# Patient Record
Sex: MALE | Race: WHITE
[De-identification: names, ages, dates, MRNs, and addresses within clinical notes are randomized per-mention and may not be internally consistent; named-entity substitution may affect disease eponyms.]

---

## 2017-12-18 ENCOUNTER — HOSPITAL ENCOUNTER (EMERGENCY)
Dept: HOSPITAL 54 - ER | Age: 42
Discharge: HOME | End: 2017-12-18
Payer: MEDICAID

## 2017-12-18 VITALS — DIASTOLIC BLOOD PRESSURE: 84 MMHG | SYSTOLIC BLOOD PRESSURE: 125 MMHG

## 2017-12-18 VITALS — BODY MASS INDEX: 35.55 KG/M2 | WEIGHT: 240 LBS | HEIGHT: 69 IN

## 2017-12-18 DIAGNOSIS — L03.113: Primary | ICD-10-CM

## 2017-12-18 DIAGNOSIS — F11.10: ICD-10-CM

## 2017-12-18 PROCEDURE — Z7610: HCPCS

## 2017-12-18 PROCEDURE — 99283 EMERGENCY DEPT VISIT LOW MDM: CPT

## 2017-12-18 PROCEDURE — A4606 OXYGEN PROBE USED W OXIMETER: HCPCS

## 2017-12-18 PROCEDURE — 96372 THER/PROPH/DIAG INJ SC/IM: CPT

## 2017-12-18 RX ADMIN — DEXTROSE MONOHYDRATE ONE MG: 50 INJECTION, SOLUTION INTRAVENOUS at 19:49

## 2018-01-21 ENCOUNTER — HOSPITAL ENCOUNTER (EMERGENCY)
Dept: HOSPITAL 54 - ER | Age: 43
LOS: 1 days | Discharge: HOME | End: 2018-01-22
Payer: MEDICAID

## 2018-01-21 VITALS — WEIGHT: 315 LBS | BODY MASS INDEX: 40.43 KG/M2 | HEIGHT: 74 IN

## 2018-01-21 VITALS — DIASTOLIC BLOOD PRESSURE: 98 MMHG | SYSTOLIC BLOOD PRESSURE: 152 MMHG

## 2018-01-21 DIAGNOSIS — L03.012: Primary | ICD-10-CM

## 2018-01-21 DIAGNOSIS — F17.200: ICD-10-CM

## 2018-01-21 PROCEDURE — 10060 I&D ABSCESS SIMPLE/SINGLE: CPT

## 2018-01-21 PROCEDURE — 99283 EMERGENCY DEPT VISIT LOW MDM: CPT

## 2018-01-21 PROCEDURE — A6402 STERILE GAUZE <= 16 SQ IN: HCPCS

## 2018-01-21 PROCEDURE — Z7610: HCPCS

## 2018-01-21 PROCEDURE — A4606 OXYGEN PROBE USED W OXIMETER: HCPCS

## 2018-01-22 NOTE — NUR
Patient discharged to home in stable condition. Written and verbal after care 
instructions given. Patient verbalizes understanding of instruction.  Pt 
ambulatory with a steady gait.  VSS NAD noted on DC.  Denies complaint on DC.

## 2018-09-03 ENCOUNTER — HOSPITAL ENCOUNTER (EMERGENCY)
Dept: HOSPITAL 54 - ER | Age: 43
Discharge: HOME | End: 2018-09-03
Payer: MEDICAID

## 2018-09-03 VITALS
HEIGHT: 73 IN | SYSTOLIC BLOOD PRESSURE: 142 MMHG | WEIGHT: 315 LBS | DIASTOLIC BLOOD PRESSURE: 88 MMHG | BODY MASS INDEX: 41.75 KG/M2

## 2018-09-03 DIAGNOSIS — J02.9: Primary | ICD-10-CM

## 2018-09-03 DIAGNOSIS — F17.200: ICD-10-CM

## 2018-09-03 PROCEDURE — 99284 EMERGENCY DEPT VISIT MOD MDM: CPT

## 2018-09-03 PROCEDURE — Z7610: HCPCS

## 2018-09-03 PROCEDURE — 87880 STREP A ASSAY W/OPTIC: CPT

## 2018-09-03 PROCEDURE — A4606 OXYGEN PROBE USED W OXIMETER: HCPCS

## 2018-09-03 PROCEDURE — 87070 CULTURE OTHR SPECIMN AEROBIC: CPT

## 2019-01-06 ENCOUNTER — HOSPITAL ENCOUNTER (EMERGENCY)
Dept: HOSPITAL 91 - FTE | Age: 44
LOS: 1 days | Discharge: HOME | End: 2019-01-07
Payer: COMMERCIAL

## 2019-01-06 ENCOUNTER — HOSPITAL ENCOUNTER (EMERGENCY)
Dept: HOSPITAL 10 - FTE | Age: 44
LOS: 1 days | Discharge: HOME | End: 2019-01-07
Payer: COMMERCIAL

## 2019-01-06 VITALS
WEIGHT: 315 LBS | BODY MASS INDEX: 41.75 KG/M2 | WEIGHT: 315 LBS | HEIGHT: 73 IN | HEIGHT: 73 IN | BODY MASS INDEX: 41.75 KG/M2

## 2019-01-06 DIAGNOSIS — F17.210: ICD-10-CM
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
## 2019-01-06 DIAGNOSIS — S60.511A: Primary | ICD-10-CM

## 2019-01-06 PROCEDURE — 73562 X-RAY EXAM OF KNEE 3: CPT

## 2019-01-06 PROCEDURE — 70486 CT MAXILLOFACIAL W/O DYE: CPT

## 2019-01-06 PROCEDURE — 99284 EMERGENCY DEPT VISIT MOD MDM: CPT

## 2019-01-06 PROCEDURE — 73130 X-RAY EXAM OF HAND: CPT

## 2019-01-07 VITALS — DIASTOLIC BLOOD PRESSURE: 80 MMHG | SYSTOLIC BLOOD PRESSURE: 130 MMHG | HEART RATE: 88 BPM | RESPIRATION RATE: 15 BRPM
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
